# Patient Record
Sex: MALE | Race: OTHER
[De-identification: names, ages, dates, MRNs, and addresses within clinical notes are randomized per-mention and may not be internally consistent; named-entity substitution may affect disease eponyms.]

---

## 2022-06-29 ENCOUNTER — HOSPITAL ENCOUNTER (EMERGENCY)
Dept: HOSPITAL 54 - ER | Age: 57
Discharge: HOME | End: 2022-06-29
Payer: COMMERCIAL

## 2022-06-29 VITALS — WEIGHT: 220 LBS | BODY MASS INDEX: 33.34 KG/M2 | HEIGHT: 68 IN

## 2022-06-29 VITALS — SYSTOLIC BLOOD PRESSURE: 128 MMHG | DIASTOLIC BLOOD PRESSURE: 81 MMHG
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
## 2022-06-29 DIAGNOSIS — Z79.899: ICD-10-CM

## 2022-06-29 DIAGNOSIS — S61.012A: Primary | ICD-10-CM

## 2022-06-29 DIAGNOSIS — E11.9: ICD-10-CM
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
## 2022-06-29 DIAGNOSIS — E78.00: ICD-10-CM
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
## 2022-06-29 DIAGNOSIS — I10: ICD-10-CM
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
## 2022-06-29 PROCEDURE — 90471 IMMUNIZATION ADMIN: CPT

## 2022-06-29 PROCEDURE — 73130 X-RAY EXAM OF HAND: CPT

## 2022-06-29 PROCEDURE — 12001 RPR S/N/AX/GEN/TRNK 2.5CM/<: CPT

## 2022-06-29 PROCEDURE — 99283 EMERGENCY DEPT VISIT LOW MDM: CPT

## 2022-06-29 PROCEDURE — 90715 TDAP VACCINE 7 YRS/> IM: CPT

## 2022-06-29 NOTE — NUR
Patient discharged to home in stable condition. Wound care done. Written and 
verbal after care instructions given. Patient verbalizes understanding of 
instruction.

## 2022-07-06 ENCOUNTER — HOSPITAL ENCOUNTER (EMERGENCY)
Dept: HOSPITAL 54 - ER | Age: 57
Discharge: HOME | End: 2022-07-06
Payer: COMMERCIAL

## 2022-07-06 VITALS — DIASTOLIC BLOOD PRESSURE: 81 MMHG | SYSTOLIC BLOOD PRESSURE: 135 MMHG

## 2022-07-06 VITALS — WEIGHT: 220 LBS | HEIGHT: 68 IN | BODY MASS INDEX: 33.34 KG/M2

## 2022-07-06 DIAGNOSIS — Z48.02: Primary | ICD-10-CM

## 2022-07-06 DIAGNOSIS — Z79.899: ICD-10-CM

## 2022-07-06 DIAGNOSIS — N18.30: ICD-10-CM
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
## 2022-07-06 DIAGNOSIS — S61.012D: ICD-10-CM

## 2022-07-06 DIAGNOSIS — I12.9: ICD-10-CM

## 2022-07-06 DIAGNOSIS — E11.22: ICD-10-CM

## 2022-07-06 DIAGNOSIS — E78.00: ICD-10-CM
